# Patient Record
Sex: MALE | Race: WHITE | NOT HISPANIC OR LATINO | URBAN - NONMETROPOLITAN AREA
[De-identification: names, ages, dates, MRNs, and addresses within clinical notes are randomized per-mention and may not be internally consistent; named-entity substitution may affect disease eponyms.]

---

## 2019-04-22 ENCOUNTER — IMPORTED ENCOUNTER (OUTPATIENT)
Dept: URBAN - NONMETROPOLITAN AREA CLINIC 1 | Facility: CLINIC | Age: 54
End: 2019-04-22

## 2019-04-22 PROBLEM — H52.03: Noted: 2019-04-22

## 2019-04-22 PROBLEM — H25.093: Noted: 2019-04-22

## 2019-04-22 PROCEDURE — 92004 COMPRE OPH EXAM NEW PT 1/>: CPT

## 2019-04-22 NOTE — PATIENT DISCUSSION
Hyperopia-Discussed diagnosis with patient. Discussed that patient is not a good canidate for Lasik due to his hyperopia and the lack of reliable results. Cataract OU-Not yet surgical. -Reviewed symptoms of advancing cataract growth such as glare and halos and decreased vision.-Continue to monitor for now. Pt will notify us if any new symptoms develop. - Patient noticing glare from headlights and difficulty seeing at night for driving. Pt to return for glare testing and if qualifies Topos/IOL master/Cataract Evaluation w/ no dilation per JS.

## 2022-04-09 ASSESSMENT — TONOMETRY
OD_IOP_MMHG: 18
OS_IOP_MMHG: 18

## 2022-04-09 ASSESSMENT — VISUAL ACUITY
OS_CC: 20/200
OU_CC: 20/25+
OS_SC: 20/20-
OD_SC: 20/20-2